# Patient Record
Sex: FEMALE | Race: WHITE | NOT HISPANIC OR LATINO | Employment: OTHER | URBAN - METROPOLITAN AREA
[De-identification: names, ages, dates, MRNs, and addresses within clinical notes are randomized per-mention and may not be internally consistent; named-entity substitution may affect disease eponyms.]

---

## 2017-08-23 ENCOUNTER — OFFICE VISIT (OUTPATIENT)
Dept: ORTHOPEDIC SURGERY | Facility: CLINIC | Age: 72
End: 2017-08-23

## 2017-08-23 VITALS — WEIGHT: 163.8 LBS | HEIGHT: 62 IN | TEMPERATURE: 98.8 F | BODY MASS INDEX: 30.14 KG/M2

## 2017-08-23 DIAGNOSIS — M17.12 PRIMARY OSTEOARTHRITIS OF LEFT KNEE: Primary | ICD-10-CM

## 2017-08-23 PROCEDURE — 99203 OFFICE O/P NEW LOW 30 MIN: CPT | Performed by: ORTHOPAEDIC SURGERY

## 2017-08-23 RX ORDER — CYCLOBENZAPRINE HCL 5 MG
5 TABLET ORAL 3 TIMES DAILY PRN
COMMUNITY
End: 2018-10-24

## 2017-08-23 RX ORDER — ACETAMINOPHEN 325 MG/1
650 TABLET ORAL EVERY 6 HOURS PRN
COMMUNITY

## 2017-08-23 RX ORDER — DILTIAZEM HYDROCHLORIDE 120 MG/1
CAPSULE, EXTENDED RELEASE ORAL
COMMUNITY
Start: 2017-07-26 | End: 2018-10-24 | Stop reason: ALTCHOICE

## 2017-08-23 RX ORDER — NAPROXEN 500 MG/1
TABLET ORAL
COMMUNITY
Start: 2017-08-03 | End: 2018-10-24

## 2017-08-23 RX ORDER — FLUTICASONE PROPIONATE 50 MCG
2 SPRAY, SUSPENSION (ML) NASAL DAILY
COMMUNITY
End: 2018-10-24

## 2017-08-23 RX ORDER — RIVAROXABAN 20 MG/1
TABLET, FILM COATED ORAL
COMMUNITY
Start: 2017-07-26

## 2017-08-23 NOTE — PROGRESS NOTES
Chief Complaint   Patient presents with   • Left Knee - Establish Care, Pain             HPI  Patient is here today to have her left knee evaluated.  She does not recall a particular injury and states that it has been hurting for about 5 weeks.Patient has been limping for about 4-5 weeks.  The pain was acute in onset.  There is no history of trauma.  There is associated swelling in the calf.  Initially the patient was felt to have a DVT but a duplex Doppler scan was obtained at [facility] which showed normal compression of the venous system without any pooling of blood or accumulation of a clot.  She's had x-rays which show some degenerative changes in the knee joint with some chondrocalcinosis but no history of from trauma or fractures.  She describes her pain and discomfort as progressively worse over the past 3-4 days.  She states that she is limping just about all the time.  She does have a history of low back pain from a herniated disks several years ago.  She describes her current pain as a dull aching sensation associated with swelling and bruising.  Some of the bruising has actually traveled down towards the ankle and the posterior aspect of the heel.  She finds it very difficult to go up and down the steps.  She cannot squat on the ground because of pain and discomfort.  She is a retired LPN and states that she does understand that this may be a Baker cyst that has ruptured and is causing her to issues with her soft tissues.          Allergies   Allergen Reactions   • Levofloxacin    • Lexapro [Escitalopram]    • Lisinopril    • Penicillins    • Sertraline          Social History     Social History   • Marital status:      Spouse name: N/A   • Number of children: N/A   • Years of education: N/A     Occupational History   • Not on file.     Social History Main Topics   • Smoking status: Not on file   • Smokeless tobacco: Not on file   • Alcohol use Not on file   • Drug use: Not on file   •  Sexual activity: Not on file     Other Topics Concern   • Not on file     Social History Narrative       No family history on file.    No past surgical history on file.    No past medical history on file.        Vitals:    08/23/17 0855   Temp: 98.8 °F (37.1 °C)             Review of Systems   Constitutional: Negative for chills, fever and unexpected weight change.   HENT: Negative for trouble swallowing and voice change.    Eyes: Negative for visual disturbance.   Respiratory: Negative for cough and shortness of breath.    Cardiovascular: Negative for chest pain and leg swelling.   Gastrointestinal: Negative for abdominal pain, nausea and vomiting.   Endocrine: Negative for cold intolerance and heat intolerance.   Genitourinary: Negative for difficulty urinating, frequency and urgency.   Musculoskeletal: Positive for arthralgias, joint swelling and myalgias.   Skin: Negative for rash and wound.   Allergic/Immunologic: Negative for immunocompromised state.   Neurological: Negative for weakness and numbness.   Hematological: Does not bruise/bleed easily.   Psychiatric/Behavioral: Negative for dysphoric mood. The patient is not nervous/anxious.            Physical Exam   Constitutional: She is oriented to person, place, and time. She appears well-nourished.   HENT:   Head: Atraumatic.   Eyes: EOM are normal.   Neck: Neck supple.   Cardiovascular: Normal rate and intact distal pulses.    Pulmonary/Chest: Breath sounds normal.   Abdominal: Soft. Bowel sounds are normal.   Musculoskeletal: She exhibits edema and tenderness. She exhibits no deformity.   Neurological: She is alert and oriented to person, place, and time. She has normal reflexes.   Skin: Skin is dry.   Psychiatric: She has a normal mood and affect. Her behavior is normal. Judgment and thought content normal.   Nursing note and vitals reviewed.              Joint/Body Part Specific Exam:  left knee. Patient has crepitus throughout range of motion. Positive  patellar grind test. Mild effusion. Lachman is negative. Pivot shift is negative. Anterior and posterior drawer signs are negative. Significant joint line tenderness is noted on the medial aspect of the knee. Patient has a varus orientation of the knee. Range of motion in flexion is for 0- 100° degrees. Neurovascular status intact.  Dorsalis pedis and posterior tibial artery pulses are palpable. Common peroneal nerve function is well preserved. Patients gait is cautious and antalgic. Skin and soft tissues are swollen; consistent with synovitis and effusion.  She has a lot of pain and discomfort along the anterior and posterior aspect of the tibia on the left side.  There is some swelling in the calf.  There is a diffuse, ill-defined tenderness in the soft tissues.  There is the presence of a hematoma around the ankle as well.  She does have some tenderness in the popliteal fossa at the site of a possible ruptured Baker cyst.          X-RAY Report:    X-rays from an outside facility show some narrowing of the medial joint space with osteophyte formation.  No fractures are noted.        Diagnostics:            Fransico was seen today for establish care and pain.    Diagnoses and all orders for this visit:    Primary osteoarthritis of left knee          Procedures          I provided this patient with educational materials regarding bone health.        Plan:   I'm unable to pinpoint a definitive diagnosis visit does appear that the patient has developed a swelling in the leg from a ruptured Baker's cyst.    With conservative, nonoperative care at this showed stabilize and heal without surgical intervention.    Ace wrap from toes to groin to help decrease the swelling.    Tablet aspirin 325 mg tab 1 by mouth daily for pain swelling and inflammation and minimizing the possibility of a DVT.    Giving the patient a cam walking boot to help stabilize the soft tissues.    Continue with tablet xarelto 10 mg tab 1 by mouth daily  for her DVT and other headache conditions.    Follow-up in one week for reevaluation.        CC To Bret Olmos MD

## 2017-08-27 PROBLEM — M17.12 PRIMARY OSTEOARTHRITIS OF LEFT KNEE: Status: ACTIVE | Noted: 2017-08-27

## 2017-08-31 ENCOUNTER — OFFICE VISIT (OUTPATIENT)
Dept: ORTHOPEDIC SURGERY | Facility: CLINIC | Age: 72
End: 2017-08-31

## 2017-08-31 DIAGNOSIS — M17.12 PRIMARY OSTEOARTHRITIS OF LEFT KNEE: Primary | ICD-10-CM

## 2017-08-31 PROCEDURE — 99213 OFFICE O/P EST LOW 20 MIN: CPT | Performed by: ORTHOPAEDIC SURGERY

## 2017-10-17 ENCOUNTER — OFFICE VISIT (OUTPATIENT)
Dept: ORTHOPEDIC SURGERY | Facility: CLINIC | Age: 72
End: 2017-10-17

## 2017-10-17 DIAGNOSIS — M17.12 PRIMARY OSTEOARTHRITIS OF LEFT KNEE: Primary | ICD-10-CM

## 2017-10-17 PROCEDURE — 99213 OFFICE O/P EST LOW 20 MIN: CPT | Performed by: ORTHOPAEDIC SURGERY

## 2017-10-17 NOTE — PROGRESS NOTES
Chief Complaint   Patient presents with   • Left Knee - Follow-up           HPI the patient is here today for a follow up of her left knee. He has pain and discomfort on the medial aspect of the knee.  Overall her symptoms have gotten a whole lot better.  The soft tissue swelling and the pain is almost completely resolved.  She did have a Simmons cyst that ruptured a few weeks ago.  Since then her symptoms have actually improved.  She has been using a brace which is helpful for managing the symptoms of the knee.  It does provide medial sided support and minimize the possibility of subluxation of the knee.  She is using anti-inflammatory medication as well.  She does have very prominent varicose veins which affect her in a negative fashion.  By the end of the day her leg is swollen and throbs quite a bit.        There were no vitals filed for this visit.        Review of Systems   Constitutional: Negative.    HENT: Negative.    Eyes: Negative.    Respiratory: Negative.    Cardiovascular: Negative.    Gastrointestinal: Negative.    Endocrine: Negative.    Genitourinary: Negative.    Musculoskeletal: Negative.    Skin: Negative.    Allergic/Immunologic: Negative.    Neurological: Negative.    Hematological: Negative.    Psychiatric/Behavioral: Negative.            Physical Exam   Constitutional: She is oriented to person, place, and time. She appears well-nourished.   HENT:   Head: Atraumatic.   Eyes: EOM are normal.   Neck: Neck supple.   Cardiovascular: Normal rate and intact distal pulses.    Pulmonary/Chest: Breath sounds normal.   Abdominal: Bowel sounds are normal.   Musculoskeletal: She exhibits edema and tenderness.   Neurological: She is alert and oriented to person, place, and time. She has normal reflexes.   Skin: Skin is dry.   Psychiatric: She has a normal mood and affect. Her behavior is normal. Judgment and thought content normal.   Vitals reviewed.          Joint/Body Part Specific Exam:  bilateral knee.  Patient has crepitus throughout range of motion. Positive patellar grind test. Mild effusion. Lachman is negative. Pivot shift is negative. Anterior and posterior drawer signs are negative. Significant joint line tenderness is noted on the medial aspect of the knee. Patient has a varus orientation of the knee. Range of motion in flexion is for 0- 120 degrees. Neurovascular status intact.  Dorsalis pedis and posterior tibial artery pulses are palpable. Common peroneal nerve function is well preserved. Patients gait is cautious and antalgic. Skin and soft tissues are swollen; consistent with synovitis and effusion.  Patient does have some pain and discomfort when she gets out of a chair and starts to walk.  The popliteal fossa cyst has fully resolved and he is not palpable and not tender either.      X-RAY Report:        Diagnostics:        Fransico was seen today for follow-up.    Diagnoses and all orders for this visit:    Primary osteoarthritis of left knee          Procedures        Plan:  Use a supportive brace to the knee to prevent it from buckling and giving out.    Ibuprofen 600 mg orally twice a day when necessary pain and discomfort.    Jobst compression garment stocking discussed with the patient to help manage the varicose veins symptoms.    I'll see him and vitamin D for bone health.    There is no indication for an intra-articular steroid injection at this point

## 2018-10-24 ENCOUNTER — OFFICE VISIT (OUTPATIENT)
Dept: CARDIOLOGY | Facility: CLINIC | Age: 73
End: 2018-10-24

## 2018-10-24 VITALS
HEIGHT: 62 IN | DIASTOLIC BLOOD PRESSURE: 72 MMHG | WEIGHT: 165 LBS | SYSTOLIC BLOOD PRESSURE: 180 MMHG | BODY MASS INDEX: 30.36 KG/M2 | HEART RATE: 59 BPM

## 2018-10-24 DIAGNOSIS — I34.0 NON-RHEUMATIC MITRAL REGURGITATION: ICD-10-CM

## 2018-10-24 DIAGNOSIS — R06.02 SOB (SHORTNESS OF BREATH): ICD-10-CM

## 2018-10-24 DIAGNOSIS — I36.1 NON-RHEUMATIC TRICUSPID VALVE INSUFFICIENCY: ICD-10-CM

## 2018-10-24 DIAGNOSIS — I48.0 PAROXYSMAL ATRIAL FIBRILLATION (HCC): Primary | ICD-10-CM

## 2018-10-24 PROCEDURE — 99203 OFFICE O/P NEW LOW 30 MIN: CPT | Performed by: INTERNAL MEDICINE

## 2018-10-24 PROCEDURE — 93000 ELECTROCARDIOGRAM COMPLETE: CPT | Performed by: INTERNAL MEDICINE

## 2018-10-24 RX ORDER — SOTALOL HYDROCHLORIDE 80 MG/1
TABLET ORAL
COMMUNITY
Start: 2018-08-20 | End: 2018-12-03 | Stop reason: ALTCHOICE

## 2018-10-24 RX ORDER — AMLODIPINE BESYLATE 5 MG/1
5 TABLET ORAL DAILY
Qty: 30 TABLET | Refills: 5 | Status: SHIPPED | OUTPATIENT
Start: 2018-10-24

## 2018-10-24 RX ORDER — HYDRALAZINE HYDROCHLORIDE 25 MG/1
25 TABLET, FILM COATED ORAL 2 TIMES DAILY
COMMUNITY
Start: 2018-08-09

## 2018-11-05 ENCOUNTER — HOSPITAL ENCOUNTER (OUTPATIENT)
Dept: CARDIOLOGY | Facility: HOSPITAL | Age: 73
Discharge: HOME | End: 2018-11-05
Attending: INTERNAL MEDICINE

## 2018-11-05 ENCOUNTER — HOSPITAL ENCOUNTER (OUTPATIENT)
Dept: CARDIOLOGY | Facility: HOSPITAL | Age: 73
Discharge: HOME | End: 2018-11-05
Attending: INTERNAL MEDICINE | Admitting: INTERNAL MEDICINE

## 2018-11-05 VITALS
DIASTOLIC BLOOD PRESSURE: 62 MMHG | OXYGEN SATURATION: 98 % | HEART RATE: 53 BPM | HEIGHT: 62 IN | RESPIRATION RATE: 16 BRPM | SYSTOLIC BLOOD PRESSURE: 130 MMHG | WEIGHT: 165 LBS | BODY MASS INDEX: 30.36 KG/M2

## 2018-11-05 PROCEDURE — 93018 CV STRESS TEST I&R ONLY: CPT | Performed by: INTERNAL MEDICINE

## 2018-11-05 PROCEDURE — 93306 TTE W/DOPPLER COMPLETE: CPT

## 2018-11-05 PROCEDURE — 78452 HT MUSCLE IMAGE SPECT MULT: CPT | Performed by: INTERNAL MEDICINE

## 2018-11-05 PROCEDURE — 93016 CV STRESS TEST SUPVJ ONLY: CPT | Performed by: INTERNAL MEDICINE

## 2018-11-05 PROCEDURE — 0 TECHNETIUM SESTAMIBI: Performed by: INTERNAL MEDICINE

## 2018-11-05 PROCEDURE — 25010000002 REGADENOSON 0.4 MG/5ML SOLUTION: Performed by: INTERNAL MEDICINE

## 2018-11-05 PROCEDURE — 93017 CV STRESS TEST TRACING ONLY: CPT

## 2018-11-05 PROCEDURE — 93306 TTE W/DOPPLER COMPLETE: CPT | Performed by: INTERNAL MEDICINE

## 2018-11-05 PROCEDURE — A9500 TC99M SESTAMIBI: HCPCS | Performed by: INTERNAL MEDICINE

## 2018-11-05 PROCEDURE — 78452 HT MUSCLE IMAGE SPECT MULT: CPT

## 2018-11-05 RX ADMIN — TECHNETIUM TC 99M SESTAMIBI 1 DOSE: 1 INJECTION INTRAVENOUS at 08:03

## 2018-11-05 RX ADMIN — REGADENOSON 0.4 MG: 0.08 INJECTION, SOLUTION INTRAVENOUS at 10:03

## 2018-11-05 RX ADMIN — TECHNETIUM TC 99M SESTAMIBI 1 DOSE: 1 INJECTION INTRAVENOUS at 10:03

## 2018-11-07 LAB
AORTIC DIMENSIONLESS INDEX: 0.5 (DI)
BH CV ECHO MEAS - ACS: 1.5 CM
BH CV ECHO MEAS - AO MAX PG (FULL): 11.6 MMHG
BH CV ECHO MEAS - AO MAX PG: 15.1 MMHG
BH CV ECHO MEAS - AO MEAN PG (FULL): 5 MMHG
BH CV ECHO MEAS - AO MEAN PG: 7 MMHG
BH CV ECHO MEAS - AO ROOT AREA (BSA CORRECTED): 1.7
BH CV ECHO MEAS - AO ROOT AREA: 7.1 CM^2
BH CV ECHO MEAS - AO ROOT DIAM: 3 CM
BH CV ECHO MEAS - AO V2 MAX: 194 CM/SEC
BH CV ECHO MEAS - AO V2 MEAN: 122 CM/SEC
BH CV ECHO MEAS - AO V2 VTI: 48.7 CM
BH CV ECHO MEAS - ASC AORTA: 2.5 CM
BH CV ECHO MEAS - AVA(I,A): 1.6 CM^2
BH CV ECHO MEAS - AVA(I,D): 1.6 CM^2
BH CV ECHO MEAS - AVA(V,A): 1.5 CM^2
BH CV ECHO MEAS - AVA(V,D): 1.5 CM^2
BH CV ECHO MEAS - BSA(HAYCOCK): 1.8 M^2
BH CV ECHO MEAS - BSA: 1.8 M^2
BH CV ECHO MEAS - BZI_BMI: 30.2 KILOGRAMS/M^2
BH CV ECHO MEAS - BZI_METRIC_HEIGHT: 157.5 CM
BH CV ECHO MEAS - BZI_METRIC_WEIGHT: 74.8 KG
BH CV ECHO MEAS - EDV(CUBED): 64 ML
BH CV ECHO MEAS - EDV(MOD-SP2): 58 ML
BH CV ECHO MEAS - EDV(MOD-SP4): 73 ML
BH CV ECHO MEAS - EDV(TEICH): 70 ML
BH CV ECHO MEAS - EF(CUBED): 75.6 %
BH CV ECHO MEAS - EF(MOD-BP): 70 %
BH CV ECHO MEAS - EF(MOD-SP2): 72.4 %
BH CV ECHO MEAS - EF(MOD-SP4): 68.5 %
BH CV ECHO MEAS - EF(TEICH): 68.1 %
BH CV ECHO MEAS - ESV(CUBED): 15.6 ML
BH CV ECHO MEAS - ESV(MOD-SP2): 16 ML
BH CV ECHO MEAS - ESV(MOD-SP4): 23 ML
BH CV ECHO MEAS - ESV(TEICH): 22.3 ML
BH CV ECHO MEAS - FS: 37.5 %
BH CV ECHO MEAS - IVS/LVPW: 1
BH CV ECHO MEAS - IVSD: 0.9 CM
BH CV ECHO MEAS - LAT PEAK E' VEL: 7 CM/SEC
BH CV ECHO MEAS - LV DIASTOLIC VOL/BSA (35-75): 41.4 ML/M^2
BH CV ECHO MEAS - LV MASS(C)D: 109.7 GRAMS
BH CV ECHO MEAS - LV MASS(C)DI: 62.3 GRAMS/M^2
BH CV ECHO MEAS - LV MAX PG: 3.5 MMHG
BH CV ECHO MEAS - LV MEAN PG: 2 MMHG
BH CV ECHO MEAS - LV SYSTOLIC VOL/BSA (12-30): 13.1 ML/M^2
BH CV ECHO MEAS - LV V1 MAX: 93.1 CM/SEC
BH CV ECHO MEAS - LV V1 MEAN: 62.9 CM/SEC
BH CV ECHO MEAS - LV V1 VTI: 24.3 CM
BH CV ECHO MEAS - LVIDD: 4 CM
BH CV ECHO MEAS - LVIDS: 2.5 CM
BH CV ECHO MEAS - LVLD AP2: 6.5 CM
BH CV ECHO MEAS - LVLD AP4: 7 CM
BH CV ECHO MEAS - LVLS AP2: 5.2 CM
BH CV ECHO MEAS - LVLS AP4: 4.8 CM
BH CV ECHO MEAS - LVOT AREA (M): 3.1 CM^2
BH CV ECHO MEAS - LVOT AREA: 3.1 CM^2
BH CV ECHO MEAS - LVOT DIAM: 2 CM
BH CV ECHO MEAS - LVPWD: 0.9 CM
BH CV ECHO MEAS - MED PEAK E' VEL: 6 CM/SEC
BH CV ECHO MEAS - MR MAX PG: 70.9 MMHG
BH CV ECHO MEAS - MR MAX VEL: 421 CM/SEC
BH CV ECHO MEAS - MV A DUR: 0.19 SEC
BH CV ECHO MEAS - MV A MAX VEL: 73.2 CM/SEC
BH CV ECHO MEAS - MV DEC SLOPE: 763 CM/SEC^2
BH CV ECHO MEAS - MV DEC TIME: 0.2 SEC
BH CV ECHO MEAS - MV E MAX VEL: 98 CM/SEC
BH CV ECHO MEAS - MV E/A: 1.3
BH CV ECHO MEAS - MV MAX PG: 6.4 MMHG
BH CV ECHO MEAS - MV MEAN PG: 2 MMHG
BH CV ECHO MEAS - MV P1/2T MAX VEL: 128 CM/SEC
BH CV ECHO MEAS - MV P1/2T: 49.1 MSEC
BH CV ECHO MEAS - MV V2 MAX: 126 CM/SEC
BH CV ECHO MEAS - MV V2 MEAN: 53.3 CM/SEC
BH CV ECHO MEAS - MV V2 VTI: 47.4 CM
BH CV ECHO MEAS - MVA P1/2T LCG: 1.7 CM^2
BH CV ECHO MEAS - MVA(P1/2T): 4.5 CM^2
BH CV ECHO MEAS - MVA(VTI): 1.6 CM^2
BH CV ECHO MEAS - PA ACC TIME: 0.14 SEC
BH CV ECHO MEAS - PA MAX PG (FULL): 3 MMHG
BH CV ECHO MEAS - PA MAX PG: 3.7 MMHG
BH CV ECHO MEAS - PA PR(ACCEL): 14.2 MMHG
BH CV ECHO MEAS - PA V2 MAX: 96.4 CM/SEC
BH CV ECHO MEAS - PULM A REVS DUR: 0.13 SEC
BH CV ECHO MEAS - PULM A REVS VEL: 29.6 CM/SEC
BH CV ECHO MEAS - PULM DIAS VEL: 47.1 CM/SEC
BH CV ECHO MEAS - PULM S/D: 0.85
BH CV ECHO MEAS - PULM SYS VEL: 39.8 CM/SEC
BH CV ECHO MEAS - PVA(V,A): 1.8 CM^2
BH CV ECHO MEAS - PVA(V,D): 1.8 CM^2
BH CV ECHO MEAS - QP/QS: 0.66
BH CV ECHO MEAS - RAP SYSTOLE: 8 MMHG
BH CV ECHO MEAS - RV MAX PG: 0.71 MMHG
BH CV ECHO MEAS - RV MEAN PG: 0 MMHG
BH CV ECHO MEAS - RV V1 MAX: 42.2 CM/SEC
BH CV ECHO MEAS - RV V1 MEAN: 32.5 CM/SEC
BH CV ECHO MEAS - RV V1 VTI: 12.2 CM
BH CV ECHO MEAS - RVOT AREA: 4.2 CM^2
BH CV ECHO MEAS - RVOT DIAM: 2.3 CM
BH CV ECHO MEAS - RVSP: 38 MMHG
BH CV ECHO MEAS - SI(AO): 195.4 ML/M^2
BH CV ECHO MEAS - SI(CUBED): 27.5 ML/M^2
BH CV ECHO MEAS - SI(LVOT): 43.3 ML/M^2
BH CV ECHO MEAS - SI(MOD-SP2): 23.8 ML/M^2
BH CV ECHO MEAS - SI(MOD-SP4): 28.4 ML/M^2
BH CV ECHO MEAS - SI(TEICH): 27.1 ML/M^2
BH CV ECHO MEAS - SV(AO): 344.2 ML
BH CV ECHO MEAS - SV(CUBED): 48.4 ML
BH CV ECHO MEAS - SV(LVOT): 76.3 ML
BH CV ECHO MEAS - SV(MOD-SP2): 42 ML
BH CV ECHO MEAS - SV(MOD-SP4): 50 ML
BH CV ECHO MEAS - SV(RVOT): 50.7 ML
BH CV ECHO MEAS - SV(TEICH): 47.7 ML
BH CV ECHO MEAS - TAPSE (>1.6): 2.6 CM2
BH CV ECHO MEAS - TR MAX VEL: 274 CM/SEC
BH CV ECHO MEASUREMENTS AVERAGE E/E' RATIO: 15.08
BH CV STRESS BP STAGE 1: NORMAL
BH CV STRESS COMMENTS STAGE 1: NORMAL
BH CV STRESS DOSE REGADENOSON STAGE 1: 0.4
BH CV STRESS DURATION MIN STAGE 1: 2
BH CV STRESS DURATION SEC STAGE 1: 32
BH CV STRESS GRADE STAGE 1: 0
BH CV STRESS HR STAGE 1: 96
BH CV STRESS METS STAGE 1: 1.4
BH CV STRESS PROTOCOL 1: NORMAL
BH CV STRESS RECOVERY BP: NORMAL MMHG
BH CV STRESS RECOVERY HR: 76 BPM
BH CV STRESS RECOVERY O2: 98 %
BH CV STRESS SPEED STAGE 1: 1
BH CV STRESS STAGE 1: 1
BH CV VAS BP LEFT ARM: NORMAL MMHG
BH CV XLRA - RV BASE: 3 CM
BH CV XLRA - TDI S': 12 CM/SEC
LEFT ATRIUM VOLUME INDEX: 24.7 ML/M2
LV EF NUC BP: 70 %
MAXIMAL PREDICTED HEART RATE: 149 BPM
MAXIMAL PREDICTED HEART RATE: 149 BPM
PERCENT MAX PREDICTED HR: 64.43 %
STRESS BASELINE BP: NORMAL MMHG
STRESS BASELINE HR: 53 BPM
STRESS O2 SAT REST: 98 %
STRESS PERCENT HR: 76 %
STRESS POST ESTIMATED WORKLOAD: 1.4 METS
STRESS POST EXERCISE DUR MIN: 2 MIN
STRESS POST EXERCISE DUR SEC: 32 SEC
STRESS POST PEAK BP: NORMAL MMHG
STRESS POST PEAK HR: 96 BPM
STRESS TARGET HR: 127 BPM
STRESS TARGET HR: 127 BPM

## 2018-12-03 ENCOUNTER — OFFICE VISIT (OUTPATIENT)
Dept: CARDIOLOGY | Facility: CLINIC | Age: 73
End: 2018-12-03

## 2018-12-03 VITALS
SYSTOLIC BLOOD PRESSURE: 158 MMHG | DIASTOLIC BLOOD PRESSURE: 76 MMHG | HEIGHT: 62 IN | HEART RATE: 59 BPM | WEIGHT: 167 LBS | BODY MASS INDEX: 30.73 KG/M2

## 2018-12-03 DIAGNOSIS — I36.1 NON-RHEUMATIC TRICUSPID VALVE INSUFFICIENCY: ICD-10-CM

## 2018-12-03 DIAGNOSIS — I48.0 PAROXYSMAL ATRIAL FIBRILLATION (HCC): Primary | ICD-10-CM

## 2018-12-03 DIAGNOSIS — I34.0 NON-RHEUMATIC MITRAL REGURGITATION: ICD-10-CM

## 2018-12-03 DIAGNOSIS — I10 ESSENTIAL HYPERTENSION: ICD-10-CM

## 2018-12-03 PROCEDURE — 99214 OFFICE O/P EST MOD 30 MIN: CPT | Performed by: INTERNAL MEDICINE

## 2018-12-03 RX ORDER — METOPROLOL SUCCINATE 50 MG/1
50 TABLET, EXTENDED RELEASE ORAL DAILY
Qty: 30 TABLET | Refills: 11 | Status: SHIPPED | OUTPATIENT
Start: 2018-12-03 | End: 2019-04-17

## 2018-12-03 NOTE — PROGRESS NOTES
Subjective:        Fransico Cobian is a 73 y.o. female who here for follow up    CC     follow-up after the stress test and echocardiogram     HPI  73-year-old female with a known history of the benign essential arterial hypertension underwent a stress test as well as echocardiogram recently as well as was started on the blood pressure medication here for the follow-up     Problem List Items Addressed This Visit        Cardiovascular and Mediastinum    Paroxysmal atrial fibrillation (CMS/HCC) - Primary    Relevant Medications    metoprolol succinate XL (TOPROL-XL) 50 MG 24 hr tablet    Non-rheumatic mitral regurgitation    Relevant Medications    metoprolol succinate XL (TOPROL-XL) 50 MG 24 hr tablet    Non-rheumatic tricuspid valve insufficiency    Relevant Medications    metoprolol succinate XL (TOPROL-XL) 50 MG 24 hr tablet    Essential hypertension    Relevant Medications    metoprolol succinate XL (TOPROL-XL) 50 MG 24 hr tablet        .Interpretation Summary        · Findings consistent with an equivocal ECG stress test.  · Left ventricular ejection fraction is normal (Calculated EF = 70%).  · Myocardial perfusion imaging indicates a normal myocardial perfusion study with no evidence of ischemia.  · Impressions are consistent with a low risk study.  · Small anterior wall ischemia seen in polar view , is not seen in spect views     Interpretation Summary     · Mild-to-moderate mitral valve regurgitation is present  · There is calcification of the aortic valve.  · Mild to moderate tricuspid valve regurgitation is present.  · Right ventricular cavity is borderline dilated.  · Left atrial cavity size is mildly dilated.  · Calculated EF = 70%.  · There is no evidence of pericardial effusion.         The following portions of the patient's history were reviewed and updated as appropriate: allergies, current medications, past family history, past medical history, past social history, past surgical history and problem  "list.    Past Medical History:   Diagnosis Date   • Atrial fibrillation (CMS/HCC)    • Hypertension    • Macular degeneration of left eye      reports that she has quit smoking. she has never used smokeless tobacco. She reports that she does not drink alcohol or use drugs.   Family History   Problem Relation Age of Onset   • Heart failure Mother    • Heart disease Mother    • Stroke Mother    • Heart attack Brother        Review of Systems  Constitutional: No wt loss, fever, fatigue  Gastrointestinal: No nausea, abdominal pain  Behavioral/Psych: No insomnia or anxiety   Cardiovascular no chest pains or tightness in the chest  Objective:            Physical Exam  /76   Pulse 59   Ht 157.5 cm (62\")   Wt 75.8 kg (167 lb)   BMI 30.54 kg/m²     General appearance: NAD, conversant   Eyes: anicteric sclerae, moist conjunctivae; no lid-lag; PERRLA   HENT: Atraumatic; oropharynx clear with moist mucous membranes and no mucosal ulcerations;  normal hard and soft palate   Neck: Trachea midline; FROM, supple, no thyromegaly or lymphadenopathy   Lungs: CTA, with normal respiratory effort and no intercostal retractions   CV: S1-S2 regular, no murmurs, no rub, no gallop   Abdomen: Soft, non-tender; no masses or HSM   Extremities: No peripheral edema or extremity lymphadenopathy  Skin: Normal temperature, turgor and texture; no rash, ulcers or subcutaneous nodules   Psych: Appropriate affect, alert and oriented to person, place and time           Procedures      Echocardiogram:        Current Outpatient Medications:   •  acetaminophen (TYLENOL) 325 MG tablet, Take 650 mg by mouth Every 6 (Six) Hours As Needed for Mild Pain ., Disp: , Rfl:   •  Aflibercept (EYLEA) 2 MG/0.05ML solution, by Intravitreal route., Disp: , Rfl:   •  amLODIPine (NORVASC) 5 MG tablet, Take 1 tablet by mouth Daily., Disp: 30 tablet, Rfl: 5  •  hydrALAZINE (APRESOLINE) 25 MG tablet, , Disp: , Rfl:   •  sotalol (BETAPACE) 80 MG tablet, , Disp: , Rfl: "   •  XARELTO 20 MG tablet, , Disp: , Rfl:    Assessment:        Patient Active Problem List   Diagnosis   • Primary osteoarthritis of left knee   • Paroxysmal atrial fibrillation (CMS/HCC)   • SOB (shortness of breath)   • Non-rheumatic mitral regurgitation   • Non-rheumatic tricuspid valve insufficiency               Plan:            ICD-10-CM ICD-9-CM   1. Paroxysmal atrial fibrillation (CMS/HCC) I48.0 427.31   2. Non-rheumatic mitral regurgitation I34.0 424.0   3. Non-rheumatic tricuspid valve insufficiency I36.1 424.2   4. Essential hypertension I10 401.9     1. Paroxysmal atrial fibrillation (CMS/HCC)  Heart rate well-controlled    2. Non-rheumatic mitral regurgitation  Treat conservatively    3. Non-rheumatic tricuspid valve insufficiency  Treat conservatively    4. Essential hypertension      BP BETTER AT HOME    Specificity and sensitivity of the stress test/ cardiac workup has been explained. Pt has been explained if  Symptoms continue please go to ER, and further w/p will be required.    Also explained this does not rule out coronary artery disease or the future events, continue to emphasize on risk reductions for coronary artery disease    Pt also advised to contact PCP for other causes of symptoms    Dc sotalol    Start toprol  50 mg    Pros and cons of this new medication / change medication has been explained to  the patient    Possible side effects has been explained    Associated need of the blood  Work has been explained    Need for the compliance of the medication has been explained      SEE IN3  MONTHS  COUNSELING:    Fransico Pearce was given to patient for the following topics: diagnostic results, risk factor reductions, impressions, risks and benefits of treatment options and importance of treatment compliance .       SMOKING COUNSELING:    Counseling given: Not Answered      EMR Dragon/Transcription disclaimer:   Much of this encounter note is an electronic transcription/translation of  spoken language to printed text. The electronic translation of spoken language may permit erroneous, or at times, nonsensical words or phrases to be inadvertently transcribed; Although I have reviewed the note for such errors, some may still exist.

## 2018-12-17 PROBLEM — I10 ESSENTIAL HYPERTENSION: Status: ACTIVE | Noted: 2018-12-17

## 2019-04-17 ENCOUNTER — OFFICE VISIT (OUTPATIENT)
Dept: CARDIOLOGY | Facility: CLINIC | Age: 74
End: 2019-04-17

## 2019-04-17 VITALS
HEIGHT: 62 IN | SYSTOLIC BLOOD PRESSURE: 164 MMHG | HEART RATE: 62 BPM | BODY MASS INDEX: 29.63 KG/M2 | WEIGHT: 161 LBS | DIASTOLIC BLOOD PRESSURE: 75 MMHG

## 2019-04-17 DIAGNOSIS — I34.0 NON-RHEUMATIC MITRAL REGURGITATION: ICD-10-CM

## 2019-04-17 DIAGNOSIS — I36.1 NON-RHEUMATIC TRICUSPID VALVE INSUFFICIENCY: ICD-10-CM

## 2019-04-17 DIAGNOSIS — I48.0 PAROXYSMAL ATRIAL FIBRILLATION (HCC): Primary | ICD-10-CM

## 2019-04-17 PROCEDURE — 93000 ELECTROCARDIOGRAM COMPLETE: CPT | Performed by: NURSE PRACTITIONER

## 2019-04-17 PROCEDURE — 99213 OFFICE O/P EST LOW 20 MIN: CPT | Performed by: NURSE PRACTITIONER

## 2019-04-17 RX ORDER — SOTALOL HYDROCHLORIDE 80 MG/1
TABLET ORAL
Refills: 3 | COMMUNITY
Start: 2019-03-26

## 2019-04-17 RX ORDER — FLUTICASONE PROPIONATE 50 MCG
SPRAY, SUSPENSION (ML) NASAL
COMMUNITY
Start: 2019-03-15

## 2019-04-17 RX ORDER — ALBUTEROL SULFATE 90 UG/1
AEROSOL, METERED RESPIRATORY (INHALATION)
COMMUNITY
Start: 2019-03-15

## 2019-05-23 ENCOUNTER — OFFICE VISIT (OUTPATIENT)
Dept: CARDIOLOGY | Facility: CLINIC | Age: 74
End: 2019-05-23

## 2019-05-23 VITALS
HEIGHT: 62 IN | HEART RATE: 93 BPM | BODY MASS INDEX: 29.45 KG/M2 | SYSTOLIC BLOOD PRESSURE: 131 MMHG | DIASTOLIC BLOOD PRESSURE: 80 MMHG

## 2019-05-23 DIAGNOSIS — I34.0 NON-RHEUMATIC MITRAL REGURGITATION: ICD-10-CM

## 2019-05-23 DIAGNOSIS — R06.02 SOB (SHORTNESS OF BREATH): ICD-10-CM

## 2019-05-23 DIAGNOSIS — I48.0 PAROXYSMAL ATRIAL FIBRILLATION (HCC): Primary | ICD-10-CM

## 2019-05-23 DIAGNOSIS — I10 ESSENTIAL HYPERTENSION: ICD-10-CM

## 2019-05-23 DIAGNOSIS — Z79.01 ANTICOAGULATED: ICD-10-CM

## 2019-05-23 PROCEDURE — 99214 OFFICE O/P EST MOD 30 MIN: CPT | Performed by: INTERNAL MEDICINE

## 2019-05-23 RX ORDER — BACLOFEN 10 MG/1
TABLET ORAL
COMMUNITY
Start: 2019-05-08

## 2019-06-04 PROBLEM — Z79.01 ANTICOAGULATED: Status: ACTIVE | Noted: 2019-06-04

## 2023-12-12 ENCOUNTER — HOSPITAL ENCOUNTER (OUTPATIENT)
Dept: RESPIRATORY THERAPY | Facility: HOSPITAL | Age: 78
Discharge: HOME | End: 2023-12-12
Admitting: INTERNAL MEDICINE
Payer: MEDICARE

## 2023-12-12 ENCOUNTER — OFFICE VISIT (OUTPATIENT)
Dept: PULMONOLOGY | Facility: CLINIC | Age: 78
End: 2023-12-12
Payer: MEDICARE

## 2023-12-12 VITALS
OXYGEN SATURATION: 93 % | WEIGHT: 168 LBS | TEMPERATURE: 97.3 F | HEIGHT: 62 IN | DIASTOLIC BLOOD PRESSURE: 66 MMHG | SYSTOLIC BLOOD PRESSURE: 120 MMHG | HEART RATE: 76 BPM | BODY MASS INDEX: 30.91 KG/M2 | RESPIRATION RATE: 18 BRPM

## 2023-12-12 DIAGNOSIS — J45.40 MODERATE PERSISTENT ASTHMA WITHOUT COMPLICATION: ICD-10-CM

## 2023-12-12 DIAGNOSIS — R06.02 SOB (SHORTNESS OF BREATH): Primary | ICD-10-CM

## 2023-12-12 DIAGNOSIS — R06.02 SOB (SHORTNESS OF BREATH): ICD-10-CM

## 2023-12-12 PROCEDURE — 94729 DIFFUSING CAPACITY: CPT

## 2023-12-12 PROCEDURE — 94060 EVALUATION OF WHEEZING: CPT

## 2023-12-12 PROCEDURE — 94726 PLETHYSMOGRAPHY LUNG VOLUMES: CPT

## 2023-12-12 RX ORDER — HYDROCHLOROTHIAZIDE 12.5 MG/1
TABLET ORAL
COMMUNITY
Start: 2023-09-08

## 2023-12-12 RX ORDER — ROSUVASTATIN CALCIUM 5 MG/1
5 TABLET, COATED ORAL DAILY
COMMUNITY

## 2023-12-12 RX ORDER — ALBUTEROL SULFATE 2.5 MG/3ML
2.5 SOLUTION RESPIRATORY (INHALATION) ONCE
Status: COMPLETED | OUTPATIENT
Start: 2023-12-12 | End: 2023-12-12

## 2023-12-12 RX ORDER — ATENOLOL 25 MG/1
25 TABLET ORAL DAILY
COMMUNITY

## 2023-12-12 RX ORDER — BUDESONIDE, GLYCOPYRROLATE, AND FORMOTEROL FUMARATE 160; 9; 4.8 UG/1; UG/1; UG/1
2 AEROSOL, METERED RESPIRATORY (INHALATION) 2 TIMES DAILY
Qty: 1 EACH | Refills: 11 | Status: SHIPPED | OUTPATIENT
Start: 2023-12-12

## 2023-12-12 RX ADMIN — ALBUTEROL SULFATE 2.5 MG: 2.5 SOLUTION RESPIRATORY (INHALATION) at 15:59

## 2023-12-12 NOTE — PROGRESS NOTES
"Chief Complaint  Establish Care, Chronic Dyspnea, and OLD    Subjective        Fransicojosé miguel Cobian presents to [facility]  History of Present Illness  Patient here to be evaluated for shortness of breath  Has been told by her doctor that it is not her heart and has her lungs  Patient has been having shortness of breath for some time now  Worse with exertion  Better with rest  Does have some chest tightness  Recent CT scan of the chest showed completely normal lungs with no evidence of ILD  PFT showed the presence of severe restrictive lung disease  Currently taking Breo does not feel that this is helping  Uses albuterol with some mild improvement in symptoms  Objective   Vital Signs:  /66 (BP Location: Left arm, Patient Position: Sitting, Cuff Size: Adult)   Pulse 76   Temp 97.3 °F (36.3 °C) (Temporal)   Resp 18   Ht 157.5 cm (62\")   Wt 76.2 kg (168 lb)   SpO2 93% Comment: Room air  BMI 30.73 kg/m²   Estimated body mass index is 30.73 kg/m² as calculated from the following:    Height as of this encounter: 157.5 cm (62\").    Weight as of this encounter: 76.2 kg (168 lb).             Physical Exam   Vital Signs Reviewed  General WDWN, Alert, NAD.    Chest:  good aeration, clear to auscultation bilaterally, tympanic to percussion bilaterally, no work of breathing noted  CV: RRR, no MGR, .  EXT:  no clubbing, no cyanosis, no edema, no joint tenderness  Neuro:  A&Ox3, CN grossly intact, no focal deficits.  Skin: No rashes or lesions noted  Result Review :      Data reviewed : PFTs from outside facility showing severe obstructive and restrictive defect, CT scan showing normal lung fields             Assessment and Plan   Diagnoses and all orders for this visit:    1. SOB (shortness of breath) (Primary)  -     Budeson-Glycopyrrol-Formoterol (Breztri Aerosphere) 160-9-4.8 MCG/ACT aerosol inhaler; Inhale 2 puffs 2 (Two) Times a Day.  Dispense: 1 each; Refill: 11  -    "  Complete PFT - Pre & Post Bronchodilator; Future    2. Moderate persistent asthma without complication      Plan  PFTs from the outside facility reviewed showing severe obstructive defect with significant response of bronchodilator therapies  Patient was also found to have a very severe restrictive defect and severe reduction in DLCO  At this time I suspect that the patient likely has obstructive asthma her PFTs at the outside facility was performed using nitrogen washout I suspect that her obstructive defect is showing a false restrictive defect given her test was done with nitrogen washout  At this time we will plan on repeating PFTs at our facility with body plethysmography this will rule out any pseudo restrictive defect  We will discontinue Breo  Start patient on Breztri  Continue as needed albuterol       Follow Up   Return in about 8 weeks (around 2/6/2024).  Patient was given instructions and counseling regarding her condition or for health maintenance advice. Please see specific information pulled into the AVS if appropriate.

## 2023-12-13 RX ORDER — BUDESONIDE, GLYCOPYRROLATE, AND FORMOTEROL FUMARATE 160; 9; 4.8 UG/1; UG/1; UG/1
2 AEROSOL, METERED RESPIRATORY (INHALATION) 2 TIMES DAILY
Qty: 2 EACH | Refills: 0 | COMMUNITY
Start: 2023-12-13 | End: 2023-12-14

## 2023-12-14 PROBLEM — J45.40 MODERATE PERSISTENT ASTHMA WITHOUT COMPLICATION: Status: ACTIVE | Noted: 2023-12-14

## 2023-12-18 ENCOUNTER — PATIENT ROUNDING (BHMG ONLY) (OUTPATIENT)
Dept: PULMONOLOGY | Facility: CLINIC | Age: 78
End: 2023-12-18
Payer: MEDICARE

## 2023-12-18 NOTE — PROGRESS NOTES
December 18, 2023    Hello, may I speak with Fransicojosé miguel Cobian?    My name is Lucy      I am  with [facility]  [address]  [address]  [place] [address] [phone].    Before we get started may I verify your date of birth? 1945    I am calling to officially welcome you to our practice and ask about your recent visit. Is this a good time to talk? My chart message sent for patient rounding.

## 2024-02-07 ENCOUNTER — OFFICE VISIT (OUTPATIENT)
Dept: PULMONOLOGY | Facility: CLINIC | Age: 79
End: 2024-02-07
Payer: MEDICARE

## 2024-02-07 VITALS
BODY MASS INDEX: 30.47 KG/M2 | RESPIRATION RATE: 16 BRPM | HEIGHT: 62 IN | DIASTOLIC BLOOD PRESSURE: 67 MMHG | TEMPERATURE: 97.8 F | WEIGHT: 165.6 LBS | HEART RATE: 79 BPM | SYSTOLIC BLOOD PRESSURE: 143 MMHG | OXYGEN SATURATION: 95 %

## 2024-02-07 DIAGNOSIS — I48.0 PAROXYSMAL ATRIAL FIBRILLATION: ICD-10-CM

## 2024-02-07 DIAGNOSIS — R05.3 CHRONIC COUGH: ICD-10-CM

## 2024-02-07 DIAGNOSIS — R06.09 DYSPNEA ON EXERTION: ICD-10-CM

## 2024-02-07 DIAGNOSIS — J45.40 MODERATE PERSISTENT ASTHMA WITHOUT COMPLICATION: Primary | ICD-10-CM

## 2024-02-07 RX ORDER — MAGNESIUM OXIDE 400 MG/1
400 TABLET ORAL DAILY
COMMUNITY

## 2024-02-07 RX ORDER — FLUTICASONE PROPIONATE AND SALMETEROL XINAFOATE 115; 21 UG/1; UG/1
2 AEROSOL, METERED RESPIRATORY (INHALATION)
Qty: 1 EACH | Refills: 11 | Status: SHIPPED | OUTPATIENT
Start: 2024-02-07

## 2024-02-07 RX ORDER — CHLORPHENIRAMINE MALEATE 4 MG/1
4 TABLET ORAL EVERY 6 HOURS PRN
COMMUNITY

## 2024-02-07 NOTE — PROGRESS NOTES
Primary Care Provider  Bret Olmos MD     Referring Provider  No ref. provider found     Chief Complaint  Shortness of Breath (No more than normal ), Cough (Dry cough ), and Follow-up (2 month f/up )    Subjective          Fransico Cobian presents to [facility] PULMONARY & CRITICAL CARE MEDICINE  History of Present Illness  Fransico Cobian is a 79 y.o. female patient of Dr. 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     Patient recently saw Dr. Barrera in December 2023 as a new patient.  She was started on Breztri at that time.  She states that this medication did work well for the first few weeks and she felt like she had more energy but then her kidney function declined and she was unable to go to the restroom.  Her pulmonary function test from outside facility showed restriction.  She had a repeat lung function test on 12/12/2023 and this showed near completely reversible obstructive defect consistent with asthma.  These findings were discussed with patient today.  She is agreeable to trying Advair HFA at this time.  She does have an intermittent dry cough.  Her shortness of breath is mild in severity, worse with exertion and improved with rest.  Patient does have atrial fibrillation and states that she wonders if some of her shortness of breath can be related to that.  Otherwise, she has no additional questions or concerns today.  She is able to perform her ADLs without difficulty.  She declines vaccines.     Her history of smoking is   Tobacco Use: Medium Risk (2/7/2024)    Patient History     Smoking Tobacco Use: Former     Smokeless Tobacco Use: Never     Passive Exposure: Past   .    Review of Systems   Constitutional:  Negative for chills, fatigue, fever, unexpected weight gain and unexpected weight loss.   HENT:  Congestion: Nasal.    Respiratory:  Positive for cough and shortness  of breath. Negative for apnea and wheezing.         Negative for Hemoptysis     Cardiovascular:  Negative for chest pain, palpitations and leg swelling.   Skin:         Negative for cyanosis      Sleep: Negative for Excessive daytime sleepiness  Negative for morning headaches  Negative for Snoring    Family History   Problem Relation Age of Onset    Heart failure Mother     Heart disease Mother     Stroke Mother     Heart attack Brother         Social History     Socioeconomic History    Marital status:    Tobacco Use    Smoking status: Former     Packs/day: 0.25     Years: 1.00     Additional pack years: 0.00     Total pack years: 0.25     Types: Cigarettes     Start date:      Quit date:      Years since quittin.1     Passive exposure: Past    Smokeless tobacco: Never    Tobacco comments:     Smoked in high school, maybe 1-3 daily.    Substance and Sexual Activity    Alcohol use: No    Drug use: No    Sexual activity: Defer     Birth control/protection: Post-menopausal        Past Medical History:   Diagnosis Date    Atrial fibrillation     Hypertension     Macular degeneration of left eye           There is no immunization history on file for this patient.      Allergies   Allergen Reactions    Diltiazem Shortness Of Breath    Amiodarone Unknown - Low Severity    Atenolol Arrhythmia     Rapid heart rate, has no problems with the Sotalol    Levofloxacin     Lexapro [Escitalopram]     Lisinopril     Olmesartan Unknown - Low Severity    Penicillins     Sertraline           Current Outpatient Medications:     amLODIPine (NORVASC) 5 MG tablet, Take 1 tablet by mouth Daily., Disp: 30 tablet, Rfl: 5    atenolol (TENORMIN) 25 MG tablet, Take 1 tablet by mouth Daily., Disp: , Rfl:     chlorpheniramine (Allergy Relief) 4 MG tablet, Take 1 tablet by mouth Every 6 (Six) Hours As Needed for Allergies., Disp: , Rfl:     hydroCHLOROthiazide (HYDRODIURIL) 12.5 MG tablet, , Disp: , Rfl:     magnesium oxide  (MAG-OX) 400 MG tablet, Take 1 tablet by mouth Daily., Disp: , Rfl:     rosuvastatin (CRESTOR) 5 MG tablet, Take 1 tablet by mouth Daily., Disp: , Rfl:     VENTOLIN  (90 Base) MCG/ACT inhaler, , Disp: , Rfl:     vitamin D3 (vitamin d) 125 MCG (5000 UT) capsule capsule, Take  by mouth., Disp: , Rfl:     XARELTO 20 MG tablet, 15 mg., Disp: , Rfl:     acetaminophen (TYLENOL) 325 MG tablet, Take 650 mg by mouth Every 6 (Six) Hours As Needed for Mild Pain . (Patient not taking: Reported on 12/12/2023), Disp: , Rfl:     Advair -21 MCG/ACT inhaler, Inhale 2 puffs 2 (Two) Times a Day., Disp: 1 each, Rfl: 11    baclofen (LIORESAL) 10 MG tablet, , Disp: , Rfl:     fluticasone (FLONASE) 50 MCG/ACT nasal spray, , Disp: , Rfl:     hydrALAZINE (APRESOLINE) 25 MG tablet, 25 mg 2 (Two) Times a Day. (Patient not taking: Reported on 12/12/2023), Disp: , Rfl:     sotalol (BETAPACE) 80 MG tablet, 1/2 tablet twice daily (Patient not taking: Reported on 12/12/2023), Disp: , Rfl: 3     Objective   Physical Exam  Constitutional:       General: She is not in acute distress.     Appearance: Normal appearance. She is normal weight.   HENT:      Right Ear: Hearing normal.      Left Ear: Hearing normal.      Nose: No nasal tenderness or congestion.      Mouth/Throat:      Mouth: Mucous membranes are moist. No oral lesions.   Eyes:      Extraocular Movements: Extraocular movements intact.      Pupils: Pupils are equal, round, and reactive to light.   Neck:      Thyroid: No thyroid mass or thyromegaly.   Cardiovascular:      Rate and Rhythm: Normal rate and regular rhythm.      Pulses: Normal pulses.      Heart sounds: Normal heart sounds. No murmur heard.  Pulmonary:      Effort: Pulmonary effort is normal.      Breath sounds: Normal breath sounds. No wheezing, rhonchi or rales.   Musculoskeletal:      Cervical back: Neck supple.      Right lower leg: No edema.      Left lower leg: No edema.   Lymphadenopathy:      Cervical: No  "cervical adenopathy.      Upper Body:      Right upper body: No axillary adenopathy.   Skin:     General: Skin is warm and dry.      Findings: No lesion or rash.   Neurological:      General: No focal deficit present.      Mental Status: She is alert and oriented to person, place, and time.   Psychiatric:         Mood and Affect: Affect normal. Mood is not anxious or depressed.         Vital Signs:   /67 (BP Location: Left arm, Patient Position: Sitting, Cuff Size: Adult)   Pulse 79   Temp 97.8 °F (36.6 °C) (Temporal)   Resp 16   Ht 157.5 cm (62\")   Wt 75.1 kg (165 lb 9.6 oz)   SpO2 95% Comment: RA  BMI 30.29 kg/m²        Result Review :   The following data was reviewed by: ARMINDA Chin on 02/07/2024:    Data reviewed : Radiologic studies chest CT 6/19/2023, pulmonary function test 6/19/2023, pulmonary function test 12/12/2023 and Dr. Barrera's last office note    Procedures        Assessment and Plan    Diagnoses and all orders for this visit:    1. Moderate persistent asthma without complication (Primary)  -     Advair -21 MCG/ACT inhaler; Inhale 2 puffs 2 (Two) Times a Day.  Dispense: 1 each; Refill: 11    2. Dyspnea on exertion    3. Paroxysmal atrial fibrillation    4. Chronic cough    5.  Start Advair 2 puffs twice daily.  Rinse mouth out after each use.  6.  Continue albuterol as needed  7.  Continue follow-up with cardiology for management of atrial fibrillation  8.  Follow-up in 3 months, sooner if needed        Follow Up   Return in about 3 months (around 5/7/2024) for Recheck with Ammy.  Patient was given instructions and counseling regarding her condition or for health maintenance advice. Please see specific information pulled into the AVS if appropriate.       "

## 2024-02-27 ENCOUNTER — OFFICE VISIT (OUTPATIENT)
Dept: PULMONOLOGY | Facility: CLINIC | Age: 79
End: 2024-02-27
Payer: MEDICARE

## 2024-02-27 VITALS
SYSTOLIC BLOOD PRESSURE: 140 MMHG | HEIGHT: 62 IN | TEMPERATURE: 97.8 F | HEART RATE: 81 BPM | WEIGHT: 165 LBS | RESPIRATION RATE: 16 BRPM | DIASTOLIC BLOOD PRESSURE: 73 MMHG | BODY MASS INDEX: 30.36 KG/M2 | OXYGEN SATURATION: 93 %

## 2024-02-27 DIAGNOSIS — R05.1 ACUTE COUGH: ICD-10-CM

## 2024-02-27 DIAGNOSIS — M79.89 LEG SWELLING: ICD-10-CM

## 2024-02-27 DIAGNOSIS — J45.30 MILD PERSISTENT ASTHMA WITHOUT COMPLICATION: Primary | ICD-10-CM

## 2024-02-27 DIAGNOSIS — R06.09 DYSPNEA ON EXERTION: ICD-10-CM

## 2024-02-27 PROCEDURE — 3078F DIAST BP <80 MM HG: CPT | Performed by: NURSE PRACTITIONER

## 2024-02-27 PROCEDURE — 1160F RVW MEDS BY RX/DR IN RCRD: CPT | Performed by: NURSE PRACTITIONER

## 2024-02-27 PROCEDURE — 99214 OFFICE O/P EST MOD 30 MIN: CPT | Performed by: NURSE PRACTITIONER

## 2024-02-27 PROCEDURE — 3077F SYST BP >= 140 MM HG: CPT | Performed by: NURSE PRACTITIONER

## 2024-02-27 PROCEDURE — 1159F MED LIST DOCD IN RCRD: CPT | Performed by: NURSE PRACTITIONER

## 2024-02-27 RX ORDER — RIVAROXABAN 15 MG/1
TABLET, FILM COATED ORAL
COMMUNITY
Start: 2024-02-07

## 2024-02-27 RX ORDER — FLUTICASONE PROPIONATE 110 UG/1
2 AEROSOL, METERED RESPIRATORY (INHALATION)
Qty: 1 EACH | Refills: 3 | Status: SHIPPED | OUTPATIENT
Start: 2024-02-27

## 2024-02-27 NOTE — PROGRESS NOTES
Primary Care Provider  Bret Olmos MD     Referring Provider  No ref. provider found     Chief Complaint  Asthma, Cough (Dry cough, when able to cough up, clear phlegm ), and Acute Visit (Stats since starting Advair, has been causing her to cough more. )    Subjective          Fransico Cobian presents to [facility] PULMONARY & CRITICAL CARE MEDICINE  History of Present Illness  Fransico Cobian is a 79 y.o. female patient of Dr. 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     Patient states she is doing okay since her last office visit.  She denies using any antibiotics or steroids for her lungs.  She denies any current fevers or chills.  She does complain of a cough while being on the Advair inhaler.  She states that she does not think to use her albuterol inhaler.  She continues to take allergy relief medication as well.  She states that she has had more congestion and cough since being on the inhalers.  Her cough is productive with clear phlegm.  She also notices intermittent leg swelling and is being managed by cardiology.  She states that she takes hydrochlorothiazide as needed.  Patient is also on amlodipine, and have explained that sometimes lower leg swelling is a side effect of this medication.  I have encouraged patient to speak with her cardiologist.  Overall, she has no additional concerns at this time.  She is able to perform her ADLs without difficulty.  She does decline vaccines.     Her history of smoking is   Tobacco Use: Medium Risk (2/27/2024)    Patient History     Smoking Tobacco Use: Former     Smokeless Tobacco Use: Never     Passive Exposure: Past   .    Review of Systems   Constitutional:  Negative for chills, fatigue, fever, unexpected weight gain and unexpected weight loss.   HENT:  Congestion: Nasal.    Respiratory:  Positive for cough and shortness of breath.  Negative for apnea and wheezing.         Negative for Hemoptysis     Cardiovascular:  Positive for leg swelling. Negative for chest pain and palpitations.   Skin:         Negative for cyanosis      Sleep: Negative for Excessive daytime sleepiness  Negative for morning headaches  Negative for Snoring    Family History   Problem Relation Age of Onset    Heart failure Mother     Heart disease Mother     Stroke Mother     Heart attack Brother         Social History     Socioeconomic History    Marital status:    Tobacco Use    Smoking status: Former     Packs/day: 0.25     Years: 1.00     Additional pack years: 0.00     Total pack years: 0.25     Types: Cigarettes     Start date:      Quit date:      Years since quittin.1     Passive exposure: Past    Smokeless tobacco: Never    Tobacco comments:     Smoked in high school, maybe 1-3 daily.    Substance and Sexual Activity    Alcohol use: No    Drug use: No    Sexual activity: Defer     Birth control/protection: Post-menopausal        Past Medical History:   Diagnosis Date    Atrial fibrillation     Hypertension     Macular degeneration of left eye           There is no immunization history on file for this patient.      Allergies   Allergen Reactions    Diltiazem Shortness Of Breath    Amiodarone Unknown - Low Severity    Atenolol Arrhythmia     Rapid heart rate, has no problems with the Sotalol    Levofloxacin     Lexapro [Escitalopram]     Lisinopril     Olmesartan Unknown - Low Severity    Penicillins     Sertraline           Current Outpatient Medications:     amLODIPine (NORVASC) 5 MG tablet, Take 1 tablet by mouth Daily., Disp: 30 tablet, Rfl: 5    chlorpheniramine (Allergy Relief) 4 MG tablet, Take 1 tablet by mouth Every 6 (Six) Hours As Needed for Allergies., Disp: , Rfl:     hydroCHLOROthiazide (HYDRODIURIL) 12.5 MG tablet, , Disp: , Rfl:     magnesium oxide (MAG-OX) 400 MG tablet, Take 1 tablet by mouth Daily., Disp: , Rfl:      rosuvastatin (CRESTOR) 5 MG tablet, Take 1 tablet by mouth Daily., Disp: , Rfl:     VENTOLIN  (90 Base) MCG/ACT inhaler, , Disp: , Rfl:     vitamin D3 (vitamin d) 125 MCG (5000 UT) capsule capsule, Take  by mouth., Disp: , Rfl:     Xarelto 15 MG tablet, , Disp: , Rfl:     acetaminophen (TYLENOL) 325 MG tablet, Take 650 mg by mouth Every 6 (Six) Hours As Needed for Mild Pain . (Patient not taking: Reported on 12/12/2023), Disp: , Rfl:     Advair -21 MCG/ACT inhaler, Inhale 2 puffs 2 (Two) Times a Day., Disp: 1 each, Rfl: 11    atenolol (TENORMIN) 25 MG tablet, Take 1 tablet by mouth Daily. (Patient not taking: Reported on 2/27/2024), Disp: , Rfl:     baclofen (LIORESAL) 10 MG tablet, , Disp: , Rfl:     fluticasone (FLONASE) 50 MCG/ACT nasal spray, , Disp: , Rfl:     fluticasone (FLOVENT HFA) 110 MCG/ACT inhaler, Inhale 2 puffs 2 (Two) Times a Day., Disp: 1 each, Rfl: 3    hydrALAZINE (APRESOLINE) 25 MG tablet, 25 mg 2 (Two) Times a Day. (Patient not taking: Reported on 12/12/2023), Disp: , Rfl:     sotalol (BETAPACE) 80 MG tablet, 1/2 tablet twice daily (Patient not taking: Reported on 12/12/2023), Disp: , Rfl: 3    XARELTO 20 MG tablet, 15 mg. (Patient not taking: Reported on 2/27/2024), Disp: , Rfl:      Objective   Physical Exam  Constitutional:       General: She is not in acute distress.     Appearance: Normal appearance. She is normal weight.   HENT:      Right Ear: Hearing normal.      Left Ear: Hearing normal.      Nose: No nasal tenderness or congestion.      Mouth/Throat:      Mouth: Mucous membranes are moist. No oral lesions.   Eyes:      Extraocular Movements: Extraocular movements intact.      Pupils: Pupils are equal, round, and reactive to light.   Neck:      Thyroid: No thyroid mass or thyromegaly.   Cardiovascular:      Rate and Rhythm: Normal rate and regular rhythm.      Pulses: Normal pulses.      Heart sounds: Normal heart sounds. No murmur heard.  Pulmonary:      Effort: Pulmonary  "effort is normal.      Breath sounds: Normal breath sounds. No wheezing, rhonchi or rales.   Musculoskeletal:      Cervical back: Neck supple.      Right lower leg: Edema present.      Left lower leg: Edema present.   Lymphadenopathy:      Cervical: No cervical adenopathy.      Upper Body:      Right upper body: No axillary adenopathy.   Skin:     General: Skin is warm and dry.      Findings: No lesion or rash.   Neurological:      General: No focal deficit present.      Mental Status: She is alert and oriented to person, place, and time.   Psychiatric:         Mood and Affect: Affect normal. Mood is not anxious or depressed.         Vital Signs:   /73 (BP Location: Right arm, Patient Position: Sitting, Cuff Size: Adult)   Pulse 81   Temp 97.8 °F (36.6 °C) (Temporal)   Resp 16   Ht 157.5 cm (62\")   Wt 74.8 kg (165 lb)   SpO2 93% Comment: RA  BMI 30.18 kg/m²        Result Review :   The following data was reviewed by: ARMINDA Chin on 02/27/2024:    Data reviewed : Radiologic studies chest CT 6/19/2023, pulmonary function test 6/19/2023, pulmonary function test 12/12/2023 and my last office note    Procedures        Assessment and Plan    Diagnoses and all orders for this visit:    1. Mild persistent asthma without complication (Primary)  -     fluticasone (FLOVENT HFA) 110 MCG/ACT inhaler; Inhale 2 puffs 2 (Two) Times a Day.  Dispense: 1 each; Refill: 3    2. Acute cough    3. Dyspnea on exertion    4. Leg swelling    5.  Stop Advair and start Flovent.  This is 2 puffs twice daily.  Rinse mouth out for each use.  6.  Continue albuterol as needed.  7.  Continue allergy relief medication as needed for seasonal allergies.  8.  Follow-up with our office as scheduled, sooner if needed.        Follow Up   Return for Next scheduled follow up.  Patient was given instructions and counseling regarding her condition or for health maintenance advice. Please see specific information pulled into the AVS if " appropriate.

## 2024-05-16 ENCOUNTER — OFFICE VISIT (OUTPATIENT)
Dept: PULMONOLOGY | Facility: CLINIC | Age: 79
End: 2024-05-16
Payer: MEDICARE

## 2024-05-16 VITALS
HEART RATE: 115 BPM | BODY MASS INDEX: 30.4 KG/M2 | HEIGHT: 62 IN | SYSTOLIC BLOOD PRESSURE: 131 MMHG | TEMPERATURE: 97.6 F | WEIGHT: 165.2 LBS | RESPIRATION RATE: 16 BRPM | OXYGEN SATURATION: 93 % | DIASTOLIC BLOOD PRESSURE: 70 MMHG

## 2024-05-16 DIAGNOSIS — R06.09 DYSPNEA ON EXERTION: ICD-10-CM

## 2024-05-16 DIAGNOSIS — J30.2 SEASONAL ALLERGIES: ICD-10-CM

## 2024-05-16 DIAGNOSIS — J45.40 MODERATE PERSISTENT ASTHMA WITHOUT COMPLICATION: Primary | Chronic | ICD-10-CM

## 2024-05-16 NOTE — PROGRESS NOTES
Primary Care Provider  Bret Olmos MD     Referring Provider  No ref. provider found     Chief Complaint  Asthma, Shortness of Breath (No more than normal ), and Follow-up (3 month f/up )    Subjective          Fransico Cobian presents to [facility] PULMON[facility] & CRITICAL CARE MEDICINE  History of Present Illness  Fransico Cobian is a 79 y.o. female patient of Dr. 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    Patient states she is doing okay since her last office visit. She denies using any antibiotics or steroids for her lungs. She denies any current fevers or chills.  Patient states that she is not currently using any inhalers, as it did affect her in various ways.  She states that for the first week or so she has more energy but then afterwards she starts having complications such as an increased heart rate and inability to sleep.  Her shortness breath is mild in severity, worse with exertion and improved with rest.  She states that she uses her albuterol inhaler approximately 1-2 times a week.  Patient states that she may also be having cataract surgery in the future and is unsure if she will need Paulo clearance.  Overall, she states that she is doing well and has no additional concerns at this time.  She is able to perform her ADLs without difficulty.  She declines vaccines     Her history of smoking is   Tobacco Use: Medium Risk (5/16/2024)    Patient History     Smoking Tobacco Use: Former     Smokeless Tobacco Use: Never     Passive Exposure: Past   .    Review of Systems   Constitutional:  Negative for chills, fatigue, fever, unexpected weight gain and unexpected weight loss.   HENT:  Congestion: Nasal.    Respiratory:  Positive for shortness of breath. Negative for apnea, cough and wheezing.         Negative for Hemoptysis     Cardiovascular:  Negative for chest pain, palpitations  and leg swelling.   Skin:         Negative for cyanosis      Sleep: Negative for Excessive daytime sleepiness  Negative for morning headaches  Negative for Snoring    Family History   Problem Relation Age of Onset    Heart failure Mother     Heart disease Mother     Stroke Mother     Heart attack Brother         Social History     Socioeconomic History    Marital status:    Tobacco Use    Smoking status: Former     Current packs/day: 0.00     Average packs/day: 0.3 packs/day for 1 year (0.3 ttl pk-yrs)     Types: Cigarettes     Start date:      Quit date:      Years since quittin.4     Passive exposure: Past    Smokeless tobacco: Never    Tobacco comments:     Smoked in high school, maybe 1-3 daily.    Substance and Sexual Activity    Alcohol use: No    Drug use: No    Sexual activity: Defer     Birth control/protection: Post-menopausal        Past Medical History:   Diagnosis Date    Atrial fibrillation     Hypertension     Macular degeneration of left eye           There is no immunization history on file for this patient.      Allergies   Allergen Reactions    Diltiazem Shortness Of Breath    Amiodarone Unknown - Low Severity    Atenolol Arrhythmia     Rapid heart rate, has no problems with the Sotalol    Levofloxacin     Lexapro [Escitalopram]     Lisinopril     Olmesartan Unknown - Low Severity    Penicillins     Sertraline           Current Outpatient Medications:     amLODIPine (NORVASC) 5 MG tablet, Take 1 tablet by mouth Daily., Disp: 30 tablet, Rfl: 5    atenolol (TENORMIN) 25 MG tablet, Take 1 tablet by mouth Daily., Disp: , Rfl:     magnesium oxide (MAG-OX) 400 MG tablet, Take 1 tablet by mouth Daily., Disp: , Rfl:     rosuvastatin (CRESTOR) 5 MG tablet, Take 1 tablet by mouth Daily., Disp: , Rfl:     VENTOLIN  (90 Base) MCG/ACT inhaler, , Disp: , Rfl:     vitamin D3 (vitamin d) 125 MCG (5000 UT) capsule capsule, Take  by mouth., Disp: , Rfl:     Xarelto 15 MG tablet, , Disp: ,  Rfl:     acetaminophen (TYLENOL) 325 MG tablet, Take 650 mg by mouth Every 6 (Six) Hours As Needed for Mild Pain . (Patient not taking: Reported on 12/12/2023), Disp: , Rfl:     baclofen (LIORESAL) 10 MG tablet, , Disp: , Rfl:     chlorpheniramine (Allergy Relief) 4 MG tablet, Take 1 tablet by mouth Every 6 (Six) Hours As Needed for Allergies. (Patient not taking: Reported on 5/16/2024), Disp: , Rfl:     fluticasone (FLONASE) 50 MCG/ACT nasal spray, , Disp: , Rfl:     hydrALAZINE (APRESOLINE) 25 MG tablet, 25 mg 2 (Two) Times a Day. (Patient not taking: Reported on 12/12/2023), Disp: , Rfl:     hydroCHLOROthiazide (HYDRODIURIL) 12.5 MG tablet, , Disp: , Rfl:     sotalol (BETAPACE) 80 MG tablet, 1/2 tablet twice daily (Patient not taking: Reported on 12/12/2023), Disp: , Rfl: 3    XARELTO 20 MG tablet, 15 mg. (Patient not taking: Reported on 2/27/2024), Disp: , Rfl:      Objective   Physical Exam  Constitutional:       General: She is not in acute distress.     Appearance: Normal appearance. She is normal weight.   HENT:      Right Ear: Hearing normal.      Left Ear: Hearing normal.      Nose: No nasal tenderness or congestion.      Mouth/Throat:      Mouth: Mucous membranes are moist. No oral lesions.   Eyes:      Extraocular Movements: Extraocular movements intact.      Pupils: Pupils are equal, round, and reactive to light.   Cardiovascular:      Rate and Rhythm: Normal rate and regular rhythm.      Pulses: Normal pulses.      Heart sounds: Normal heart sounds. No murmur heard.  Pulmonary:      Effort: Pulmonary effort is normal.      Breath sounds: Normal breath sounds. No wheezing, rhonchi or rales.   Musculoskeletal:      Right lower leg: No edema.      Left lower leg: No edema.   Skin:     General: Skin is warm and dry.      Findings: No lesion or rash.   Neurological:      General: No focal deficit present.      Mental Status: She is alert and oriented to person, place, and time.   Psychiatric:         Mood  "and Affect: Affect normal. Mood is not anxious or depressed.         Vital Signs:   /70 (BP Location: Right arm, Patient Position: Sitting, Cuff Size: Adult)   Pulse 115   Temp 97.6 °F (36.4 °C) (Oral)   Resp 16   Ht 157.5 cm (62\")   Wt 74.9 kg (165 lb 3.2 oz)   SpO2 93% Comment: RA  BMI 30.22 kg/m²        Result Review :   The following data was reviewed by: ARMINDA Chin on 05/16/2024:    Data reviewed : pulmonary function test 12/12/2023 and my last office note    Procedures        Assessment and Plan    Diagnoses and all orders for this visit:    1. Moderate persistent asthma without complication (Primary)  Comments:  Continue albuterol.  Patient does not tolerate maintenance inhalers    2. Dyspnea on exertion  Comments:  Continue albuterol    3. Seasonal allergies  Comments:  Stable.  Encourage patient to restart nasal sprays          Follow Up   Return in about 6 months (around 11/16/2024) for Recheck with Isabelle/Bruce.  Patient was given instructions and counseling regarding her condition or for health maintenance advice. Please see specific information pulled into the AVS if appropriate.       "

## 2024-11-15 ENCOUNTER — OFFICE VISIT (OUTPATIENT)
Dept: PULMONOLOGY | Facility: CLINIC | Age: 79
End: 2024-11-15
Payer: MEDICARE

## 2024-11-15 VITALS
HEIGHT: 62 IN | OXYGEN SATURATION: 94 % | BODY MASS INDEX: 30.33 KG/M2 | WEIGHT: 164.8 LBS | RESPIRATION RATE: 16 BRPM | SYSTOLIC BLOOD PRESSURE: 144 MMHG | DIASTOLIC BLOOD PRESSURE: 82 MMHG | TEMPERATURE: 97.6 F | HEART RATE: 81 BPM

## 2024-11-15 DIAGNOSIS — F17.211 NICOTINE DEPENDENCE, CIGARETTES, IN REMISSION: ICD-10-CM

## 2024-11-15 DIAGNOSIS — R06.09 DYSPNEA ON EXERTION: ICD-10-CM

## 2024-11-15 DIAGNOSIS — J45.20 MILD INTERMITTENT ASTHMA WITHOUT COMPLICATION: Primary | Chronic | ICD-10-CM

## 2024-11-15 RX ORDER — DICLOFENAC SODIUM 1 MG/ML
SOLUTION/ DROPS OPHTHALMIC
COMMUNITY
Start: 2024-07-25

## 2024-11-15 RX ORDER — ALCLOMETASONE DIPROPIONATE 0.5 MG/G
CREAM TOPICAL
COMMUNITY
Start: 2024-09-23

## 2024-11-15 RX ORDER — POLYMYXIN B SULFATE AND TRIMETHOPRIM 1; 10000 MG/ML; [USP'U]/ML
SOLUTION OPHTHALMIC
COMMUNITY
Start: 2024-07-25

## 2024-11-15 RX ORDER — PREDNISOLONE ACETATE 10 MG/ML
SUSPENSION/ DROPS OPHTHALMIC
COMMUNITY
Start: 2024-07-25

## 2024-11-15 NOTE — PROGRESS NOTES
Primary Care Provider  Bret Olmos MD     Referring Provider  No ref. provider found     Chief Complaint  Asthma, Shortness of Breath (Possible pulled muscle, L side x this AM), and Follow-up (6 month f/up )    Subjective          Fransico Cobian presents to [facility]  History of Present Illness  Fransico Cobian is a 79 y.o. female patient of Dr. 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     Patient states she is doing okay since her last office visit. She denies using any antibiotics or steroids for her lungs. She denies any current fevers or chills. Her shortness breath is mild in severity, worse with exertion and improved with rest.  Patient states that she believes she is having some increased shortness of breath due to atrial fibrillation.  She is scheduled to see an electrophysiologist next month.  She has not needed to use her albuterol inhaler.  Overall, states she is doing well and has no additional concerns at this time.  She is able to perform her ADLs without difficulty.     Her history of smoking is   Tobacco Use: Medium Risk (11/15/2024)    Patient History     Smoking Tobacco Use: Former     Smokeless Tobacco Use: Never     Passive Exposure: Past   .    Review of Systems   Constitutional:  Negative for chills, fatigue, fever, unexpected weight gain and unexpected weight loss.   HENT:  Congestion: Nasal.    Respiratory:  Positive for shortness of breath. Negative for apnea, cough and wheezing.         Negative for Hemoptysis     Cardiovascular:  Negative for chest pain, palpitations and leg swelling.   Skin:         Negative for cyanosis      Sleep: Negative for Excessive daytime sleepiness  Negative for morning headaches  Negative for Snoring    Family History   Problem Relation Age of Onset    Heart failure Mother     Heart disease Mother      Stroke Mother     Heart attack Brother         Social History     Socioeconomic History    Marital status:    Tobacco Use    Smoking status: Former     Current packs/day: 0.00     Average packs/day: 0.3 packs/day for 1 year (0.3 ttl pk-yrs)     Types: Cigarettes     Start date:      Quit date:      Years since quittin.9     Passive exposure: Past    Smokeless tobacco: Never    Tobacco comments:     Smoked in high school, maybe 1-3 daily.    Vaping Use    Vaping status: Never Used   Substance and Sexual Activity    Alcohol use: No    Drug use: No    Sexual activity: Defer     Birth control/protection: Post-menopausal        Past Medical History:   Diagnosis Date    Atrial fibrillation     Hypertension     Macular degeneration of left eye           There is no immunization history on file for this patient.      Allergies   Allergen Reactions    Diltiazem Shortness Of Breath    Amiodarone Unknown - Low Severity    Atenolol Arrhythmia     Rapid heart rate, has no problems with the Sotalol    Levofloxacin     Lexapro [Escitalopram]     Lisinopril     Olmesartan Unknown - Low Severity    Penicillins     Sertraline           Current Outpatient Medications:     alclometasone (ACLOVATE) 0.05 % cream, , Disp: , Rfl:     amLODIPine (NORVASC) 5 MG tablet, Take 1 tablet by mouth Daily., Disp: 30 tablet, Rfl: 5    atenolol (TENORMIN) 25 MG tablet, Take 1 tablet by mouth Daily., Disp: , Rfl:     hydroCHLOROthiazide (HYDRODIURIL) 12.5 MG tablet, , Disp: , Rfl:     magnesium oxide (MAG-OX) 400 MG tablet, Take 1 tablet by mouth Daily., Disp: , Rfl:     rosuvastatin (CRESTOR) 5 MG tablet, Take 1 tablet by mouth Daily., Disp: , Rfl:     VENTOLIN  (90 Base) MCG/ACT inhaler, , Disp: , Rfl:     vitamin D3 (vitamin d) 125 MCG (5000 UT) capsule capsule, Take  by mouth., Disp: , Rfl:     Xarelto 15 MG tablet, , Disp: , Rfl:     acetaminophen (TYLENOL) 325 MG tablet, Take 650 mg by mouth Every 6 (Six) Hours As  Needed for Mild Pain . (Patient not taking: Reported on 11/15/2024), Disp: , Rfl:     baclofen (LIORESAL) 10 MG tablet, , Disp: , Rfl:     chlorpheniramine (Allergy Relief) 4 MG tablet, Take 1 tablet by mouth Every 6 (Six) Hours As Needed for Allergies. (Patient not taking: Reported on 11/15/2024), Disp: , Rfl:     diclofenac (VOLTAREN) 0.1 % ophthalmic solution, , Disp: , Rfl:     fluticasone (FLONASE) 50 MCG/ACT nasal spray, , Disp: , Rfl:     hydrALAZINE (APRESOLINE) 25 MG tablet, 25 mg 2 (Two) Times a Day. (Patient not taking: Reported on 11/15/2024), Disp: , Rfl:     prednisoLONE acetate (PRED FORTE) 1 % ophthalmic suspension, , Disp: , Rfl:     sotalol (BETAPACE) 80 MG tablet, 1/2 tablet twice daily (Patient not taking: Reported on 11/15/2024), Disp: , Rfl: 3    trimethoprim-polymyxin b (POLYTRIM) 74888-0.1 UNIT/ML-% ophthalmic solution, , Disp: , Rfl:     XARELTO 20 MG tablet, 15 mg. (Patient not taking: Reported on 2/27/2024), Disp: , Rfl:      Objective   Physical Exam  Constitutional:       General: She is not in acute distress.     Appearance: Normal appearance. She is normal weight.   HENT:      Right Ear: Hearing normal.      Left Ear: Hearing normal.      Nose: No nasal tenderness or congestion.      Mouth/Throat:      Mouth: Mucous membranes are moist. No oral lesions.   Eyes:      Extraocular Movements: Extraocular movements intact.      Pupils: Pupils are equal, round, and reactive to light.   Cardiovascular:      Rate and Rhythm: Normal rate and regular rhythm.      Pulses: Normal pulses.      Heart sounds: Normal heart sounds. No murmur heard.  Pulmonary:      Effort: Pulmonary effort is normal.      Breath sounds: Normal breath sounds. No wheezing, rhonchi or rales.   Musculoskeletal:      Right lower leg: No edema.      Left lower leg: No edema.   Skin:     General: Skin is warm and dry.      Findings: No lesion or rash.   Neurological:      General: No focal deficit present.      Mental Status:  "She is alert and oriented to person, place, and time.   Psychiatric:         Mood and Affect: Affect normal. Mood is not anxious or depressed.         Vital Signs:   /82 (BP Location: Right arm, Patient Position: Sitting, Cuff Size: Adult)   Pulse 81   Temp 97.6 °F (36.4 °C) (Oral)   Resp 16   Ht 157.5 cm (62\")   Wt 74.8 kg (164 lb 12.8 oz)   SpO2 94% Comment: RA  BMI 30.14 kg/m²        Result Review :   The following data was reviewed by: ARMINDA Chin on 11/15/2024:    Data reviewed : Radiologic studies chest CT 6/19/2023 and my last office note    Procedures        Assessment and Plan    Diagnoses and all orders for this visit:    1. Mild intermittent asthma without complication (Primary)  Comments:  Albuterol as needed    2. Nicotine dependence, cigarettes, in remission    3. Dyspnea on exertion  Comments:  Albuterol as needed          Follow Up   Return in about 6 months (around 5/15/2025) for Recheck with Eduardo.  Patient was given instructions and counseling regarding her condition or for health maintenance advice. Please see specific information pulled into the AVS if appropriate.       "